# Patient Record
Sex: MALE | Race: WHITE | Employment: FULL TIME | URBAN - METROPOLITAN AREA
[De-identification: names, ages, dates, MRNs, and addresses within clinical notes are randomized per-mention and may not be internally consistent; named-entity substitution may affect disease eponyms.]

---

## 2021-02-10 ENCOUNTER — APPOINTMENT (OUTPATIENT)
Dept: GENERAL RADIOLOGY | Age: 51
End: 2021-02-10
Attending: EMERGENCY MEDICINE
Payer: COMMERCIAL

## 2021-02-10 ENCOUNTER — HOSPITAL ENCOUNTER (OUTPATIENT)
Age: 51
Setting detail: OBSERVATION
Discharge: HOME | End: 2021-02-11
Attending: EMERGENCY MEDICINE | Admitting: FAMILY MEDICINE
Payer: COMMERCIAL

## 2021-02-10 DIAGNOSIS — N20.0 KIDNEY STONE: ICD-10-CM

## 2021-02-10 DIAGNOSIS — R10.9 LEFT FLANK PAIN: Primary | ICD-10-CM

## 2021-02-10 LAB
ALBUMIN SERPL-MCNC: 3.7 G/DL (ref 3.5–5)
ALBUMIN/GLOB SERPL: 1 {RATIO} (ref 1.1–2.2)
ALP SERPL-CCNC: 73 U/L (ref 45–117)
ALT SERPL-CCNC: 48 U/L (ref 12–78)
AMORPH CRY URNS QL MICRO: ABNORMAL
ANION GAP SERPL CALC-SCNC: 5 MMOL/L (ref 5–15)
APPEARANCE UR: ABNORMAL
AST SERPL-CCNC: 41 U/L (ref 15–37)
BACTERIA URNS QL MICRO: NEGATIVE /HPF
BASOPHILS # BLD: 0 K/UL (ref 0–0.1)
BASOPHILS NFR BLD: 0 % (ref 0–1)
BILIRUB SERPL-MCNC: 0.7 MG/DL (ref 0.2–1)
BILIRUB UR QL: NEGATIVE
BUN SERPL-MCNC: 24 MG/DL (ref 6–20)
BUN/CREAT SERPL: 21 (ref 12–20)
CALCIUM SERPL-MCNC: 8.9 MG/DL (ref 8.5–10.1)
CHLORIDE SERPL-SCNC: 103 MMOL/L (ref 97–108)
CO2 SERPL-SCNC: 27 MMOL/L (ref 21–32)
COLOR UR: ABNORMAL
COMMENT, HOLDF: NORMAL
CREAT SERPL-MCNC: 1.14 MG/DL (ref 0.7–1.3)
DIFFERENTIAL METHOD BLD: ABNORMAL
EOSINOPHIL # BLD: 0 K/UL (ref 0–0.4)
EOSINOPHIL NFR BLD: 0 % (ref 0–7)
EPITH CASTS URNS QL MICRO: ABNORMAL /LPF
ERYTHROCYTE [DISTWIDTH] IN BLOOD BY AUTOMATED COUNT: 12.8 % (ref 11.5–14.5)
GLOBULIN SER CALC-MCNC: 3.6 G/DL (ref 2–4)
GLUCOSE SERPL-MCNC: 110 MG/DL (ref 65–100)
GLUCOSE UR STRIP.AUTO-MCNC: NEGATIVE MG/DL
HCT VFR BLD AUTO: 41.8 % (ref 36.6–50.3)
HGB BLD-MCNC: 14 G/DL (ref 12.1–17)
HGB UR QL STRIP: ABNORMAL
IMM GRANULOCYTES # BLD AUTO: 0 K/UL (ref 0–0.04)
IMM GRANULOCYTES NFR BLD AUTO: 0 % (ref 0–0.5)
KETONES UR QL STRIP.AUTO: ABNORMAL MG/DL
LEUKOCYTE ESTERASE UR QL STRIP.AUTO: ABNORMAL
LIPASE SERPL-CCNC: 218 U/L (ref 73–393)
LYMPHOCYTES # BLD: 1.2 K/UL (ref 0.8–3.5)
LYMPHOCYTES NFR BLD: 10 % (ref 12–49)
MCH RBC QN AUTO: 30.4 PG (ref 26–34)
MCHC RBC AUTO-ENTMCNC: 33.5 G/DL (ref 30–36.5)
MCV RBC AUTO: 90.9 FL (ref 80–99)
MONOCYTES # BLD: 0.6 K/UL (ref 0–1)
MONOCYTES NFR BLD: 5 % (ref 5–13)
NEUTS SEG # BLD: 10.1 K/UL (ref 1.8–8)
NEUTS SEG NFR BLD: 85 % (ref 32–75)
NITRITE UR QL STRIP.AUTO: NEGATIVE
NRBC # BLD: 0 K/UL (ref 0–0.01)
NRBC BLD-RTO: 0 PER 100 WBC
PH UR STRIP: 5 [PH] (ref 5–8)
PLATELET # BLD AUTO: 179 K/UL (ref 150–400)
PMV BLD AUTO: 9.6 FL (ref 8.9–12.9)
POTASSIUM SERPL-SCNC: 4 MMOL/L (ref 3.5–5.1)
PROT SERPL-MCNC: 7.3 G/DL (ref 6.4–8.2)
PROT UR STRIP-MCNC: ABNORMAL MG/DL
RBC # BLD AUTO: 4.6 M/UL (ref 4.1–5.7)
RBC #/AREA URNS HPF: ABNORMAL /HPF (ref 0–5)
SAMPLES BEING HELD,HOLD: NORMAL
SODIUM SERPL-SCNC: 135 MMOL/L (ref 136–145)
SP GR UR REFRACTOMETRY: 1.03
UROBILINOGEN UR QL STRIP.AUTO: 1 EU/DL (ref 0.2–1)
WBC # BLD AUTO: 12 K/UL (ref 4.1–11.1)
WBC URNS QL MICRO: ABNORMAL /HPF (ref 0–4)

## 2021-02-10 PROCEDURE — 74018 RADEX ABDOMEN 1 VIEW: CPT

## 2021-02-10 PROCEDURE — 74011000258 HC RX REV CODE- 258: Performed by: FAMILY MEDICINE

## 2021-02-10 PROCEDURE — 81001 URINALYSIS AUTO W/SCOPE: CPT

## 2021-02-10 PROCEDURE — 83690 ASSAY OF LIPASE: CPT

## 2021-02-10 PROCEDURE — 74011250636 HC RX REV CODE- 250/636: Performed by: EMERGENCY MEDICINE

## 2021-02-10 PROCEDURE — 87086 URINE CULTURE/COLONY COUNT: CPT

## 2021-02-10 PROCEDURE — 99218 HC RM OBSERVATION: CPT

## 2021-02-10 PROCEDURE — 74011250636 HC RX REV CODE- 250/636: Performed by: FAMILY MEDICINE

## 2021-02-10 PROCEDURE — 99284 EMERGENCY DEPT VISIT MOD MDM: CPT

## 2021-02-10 PROCEDURE — 80053 COMPREHEN METABOLIC PANEL: CPT

## 2021-02-10 PROCEDURE — 96374 THER/PROPH/DIAG INJ IV PUSH: CPT

## 2021-02-10 PROCEDURE — 96375 TX/PRO/DX INJ NEW DRUG ADDON: CPT

## 2021-02-10 PROCEDURE — 74011000250 HC RX REV CODE- 250: Performed by: FAMILY MEDICINE

## 2021-02-10 PROCEDURE — 96376 TX/PRO/DX INJ SAME DRUG ADON: CPT

## 2021-02-10 PROCEDURE — 36415 COLL VENOUS BLD VENIPUNCTURE: CPT

## 2021-02-10 PROCEDURE — 85025 COMPLETE CBC W/AUTO DIFF WBC: CPT

## 2021-02-10 PROCEDURE — 74011250636 HC RX REV CODE- 250/636

## 2021-02-10 RX ORDER — ACETAMINOPHEN 325 MG/1
650 TABLET ORAL
Status: DISCONTINUED | OUTPATIENT
Start: 2021-02-10 | End: 2021-02-11 | Stop reason: HOSPADM

## 2021-02-10 RX ORDER — SODIUM CHLORIDE 9 MG/ML
150 INJECTION, SOLUTION INTRAVENOUS ONCE
Status: COMPLETED | OUTPATIENT
Start: 2021-02-10 | End: 2021-02-10

## 2021-02-10 RX ORDER — TAMSULOSIN HYDROCHLORIDE 0.4 MG/1
0.4 CAPSULE ORAL DAILY
COMMUNITY

## 2021-02-10 RX ORDER — MORPHINE SULFATE 2 MG/ML
2 INJECTION, SOLUTION INTRAMUSCULAR; INTRAVENOUS
Status: DISCONTINUED | OUTPATIENT
Start: 2021-02-10 | End: 2021-02-11

## 2021-02-10 RX ORDER — ONDANSETRON 2 MG/ML
4 INJECTION INTRAMUSCULAR; INTRAVENOUS
Status: COMPLETED | OUTPATIENT
Start: 2021-02-10 | End: 2021-02-10

## 2021-02-10 RX ORDER — HYDROMORPHONE HYDROCHLORIDE 1 MG/ML
1 INJECTION, SOLUTION INTRAMUSCULAR; INTRAVENOUS; SUBCUTANEOUS
Status: COMPLETED | OUTPATIENT
Start: 2021-02-10 | End: 2021-02-10

## 2021-02-10 RX ORDER — SODIUM CHLORIDE 9 MG/ML
125 INJECTION, SOLUTION INTRAVENOUS CONTINUOUS
Status: DISCONTINUED | OUTPATIENT
Start: 2021-02-10 | End: 2021-02-11 | Stop reason: HOSPADM

## 2021-02-10 RX ORDER — SODIUM CHLORIDE 0.9 % (FLUSH) 0.9 %
5-40 SYRINGE (ML) INJECTION AS NEEDED
Status: DISCONTINUED | OUTPATIENT
Start: 2021-02-10 | End: 2021-02-11 | Stop reason: HOSPADM

## 2021-02-10 RX ORDER — HYDROMORPHONE HYDROCHLORIDE 1 MG/ML
INJECTION, SOLUTION INTRAMUSCULAR; INTRAVENOUS; SUBCUTANEOUS
Status: COMPLETED
Start: 2021-02-10 | End: 2021-02-10

## 2021-02-10 RX ORDER — KETOROLAC TROMETHAMINE 30 MG/ML
30 INJECTION, SOLUTION INTRAMUSCULAR; INTRAVENOUS
Status: COMPLETED | OUTPATIENT
Start: 2021-02-10 | End: 2021-02-10

## 2021-02-10 RX ORDER — SODIUM CHLORIDE 0.9 % (FLUSH) 0.9 %
5-40 SYRINGE (ML) INJECTION EVERY 8 HOURS
Status: DISCONTINUED | OUTPATIENT
Start: 2021-02-10 | End: 2021-02-11 | Stop reason: HOSPADM

## 2021-02-10 RX ORDER — KETOROLAC TROMETHAMINE 10 MG/1
10 TABLET, FILM COATED ORAL
COMMUNITY
End: 2021-02-11

## 2021-02-10 RX ORDER — METOCLOPRAMIDE HYDROCHLORIDE 5 MG/ML
10 INJECTION INTRAMUSCULAR; INTRAVENOUS
Status: COMPLETED | OUTPATIENT
Start: 2021-02-10 | End: 2021-02-10

## 2021-02-10 RX ORDER — TAMSULOSIN HYDROCHLORIDE 0.4 MG/1
0.4 CAPSULE ORAL DAILY
Status: DISCONTINUED | OUTPATIENT
Start: 2021-02-10 | End: 2021-02-11 | Stop reason: HOSPADM

## 2021-02-10 RX ORDER — TAMSULOSIN HYDROCHLORIDE 0.4 MG/1
0.4 CAPSULE ORAL DAILY
Status: DISCONTINUED | OUTPATIENT
Start: 2021-02-11 | End: 2021-02-10 | Stop reason: SDUPTHER

## 2021-02-10 RX ORDER — HYDROCODONE BITARTRATE AND ACETAMINOPHEN 5; 325 MG/1; MG/1
1 TABLET ORAL
COMMUNITY
End: 2021-02-11

## 2021-02-10 RX ORDER — DIPHENHYDRAMINE HYDROCHLORIDE 50 MG/ML
12.5 INJECTION, SOLUTION INTRAMUSCULAR; INTRAVENOUS
Status: DISCONTINUED | OUTPATIENT
Start: 2021-02-10 | End: 2021-02-10

## 2021-02-10 RX ORDER — ONDANSETRON 2 MG/ML
INJECTION INTRAMUSCULAR; INTRAVENOUS
Status: COMPLETED
Start: 2021-02-10 | End: 2021-02-10

## 2021-02-10 RX ORDER — ONDANSETRON 2 MG/ML
INJECTION INTRAMUSCULAR; INTRAVENOUS
Status: DISPENSED
Start: 2021-02-10 | End: 2021-02-11

## 2021-02-10 RX ORDER — MORPHINE SULFATE 2 MG/ML
2 INJECTION, SOLUTION INTRAMUSCULAR; INTRAVENOUS
Status: COMPLETED | OUTPATIENT
Start: 2021-02-10 | End: 2021-02-10

## 2021-02-10 RX ORDER — ONDANSETRON 2 MG/ML
4 INJECTION INTRAMUSCULAR; INTRAVENOUS
Status: DISCONTINUED | OUTPATIENT
Start: 2021-02-10 | End: 2021-02-10 | Stop reason: SDUPTHER

## 2021-02-10 RX ORDER — ONDANSETRON 2 MG/ML
4 INJECTION INTRAMUSCULAR; INTRAVENOUS
Status: DISCONTINUED | OUTPATIENT
Start: 2021-02-10 | End: 2021-02-11 | Stop reason: HOSPADM

## 2021-02-10 RX ADMIN — MORPHINE SULFATE 2 MG: 2 INJECTION, SOLUTION INTRAMUSCULAR; INTRAVENOUS at 20:28

## 2021-02-10 RX ADMIN — SODIUM CHLORIDE 125 ML/HR: 9 INJECTION, SOLUTION INTRAVENOUS at 20:47

## 2021-02-10 RX ADMIN — SODIUM CHLORIDE 1000 ML: 9 INJECTION, SOLUTION INTRAVENOUS at 12:22

## 2021-02-10 RX ADMIN — ONDANSETRON 4 MG: 2 INJECTION INTRAMUSCULAR; INTRAVENOUS at 14:28

## 2021-02-10 RX ADMIN — CEFTRIAXONE SODIUM 1 G: 1 INJECTION, POWDER, FOR SOLUTION INTRAMUSCULAR; INTRAVENOUS at 19:18

## 2021-02-10 RX ADMIN — ONDANSETRON 4 MG: 2 INJECTION INTRAMUSCULAR; INTRAVENOUS at 12:22

## 2021-02-10 RX ADMIN — ONDANSETRON 4 MG: 2 INJECTION INTRAMUSCULAR; INTRAVENOUS at 16:15

## 2021-02-10 RX ADMIN — MORPHINE SULFATE 2 MG: 2 INJECTION, SOLUTION INTRAMUSCULAR; INTRAVENOUS at 12:22

## 2021-02-10 RX ADMIN — HYDROMORPHONE HYDROCHLORIDE 1 MG: 1 INJECTION, SOLUTION INTRAMUSCULAR; INTRAVENOUS; SUBCUTANEOUS at 13:13

## 2021-02-10 RX ADMIN — ONDANSETRON 4 MG: 2 INJECTION INTRAMUSCULAR; INTRAVENOUS at 18:25

## 2021-02-10 RX ADMIN — PROCHLORPERAZINE EDISYLATE 5 MG: 5 INJECTION INTRAMUSCULAR; INTRAVENOUS at 20:20

## 2021-02-10 RX ADMIN — HYDROMORPHONE HYDROCHLORIDE 1 MG: 1 INJECTION, SOLUTION INTRAMUSCULAR; INTRAVENOUS; SUBCUTANEOUS at 16:15

## 2021-02-10 RX ADMIN — SODIUM CHLORIDE 150 ML/HR: 9 INJECTION, SOLUTION INTRAVENOUS at 16:15

## 2021-02-10 RX ADMIN — METOCLOPRAMIDE 10 MG: 5 INJECTION, SOLUTION INTRAMUSCULAR; INTRAVENOUS at 17:33

## 2021-02-10 RX ADMIN — HYDROMORPHONE HYDROCHLORIDE 1 MG: 1 INJECTION, SOLUTION INTRAMUSCULAR; INTRAVENOUS; SUBCUTANEOUS at 14:55

## 2021-02-10 RX ADMIN — KETOROLAC TROMETHAMINE 30 MG: 30 INJECTION, SOLUTION INTRAMUSCULAR at 12:22

## 2021-02-10 NOTE — ED TRIAGE NOTES
Patient arrives c/o left flank pain. Patient states he believes he has a kidney stone. Patient states he saw a urologist, Dr. Rukhsana Musa and was diagnosed with a 6mm stone. Patient states he had abdominal surgery 30 years ago for reflux and since then he has not been able vomit since. Patient was instructed if he got nauseous to come to the ER. Patient denies nausea or pain now.

## 2021-02-10 NOTE — PROGRESS NOTES
Admission Medication Reconciliation: In progress:    Unable to speak with patient face to face at this time due to general isolation precautions in the ED related to COVID-19 pandemic. Chart notes and RX Query were used to update medication list.    Medication changes (since last review): Added all agents (expulsive therapy for kidney stone)    Thank you for allowing me to participate in the care of your patient. Harriett Sanabria PharmD, RN # [phone]     [facility] pharmacy benefit data reflects medications filled and processed through the patient's insurance, however   this data does NOT capture whether the medication was picked up or is currently being taken by the patient. Allergies:  Patient has no known allergies. Significant PMH/Disease States: History reviewed. No pertinent past medical history. Chief Complaint for this Admission:    Chief Complaint   Patient presents with    Back Pain     Prior to Admission Medications:   Prior to Admission Medications   Prescriptions Last Dose Informant Taking? HYDROcodone-acetaminophen (NORCO) 5-325 mg per tablet   Yes   Sig: Take 1 Tab by mouth every six (6) hours as needed for Pain (Kidney stone). X 5 days supply   ketorolac (TORADOL) 10 mg tablet   Yes   Sig: Take 10 mg by mouth every six (6) hours as needed for Pain (Kidney stone). tamsulosin (FLOMAX) 0.4 mg capsule   Yes   Sig: Take 0.4 mg by mouth daily. X 20 days kidney stone      Facility-Administered Medications: None     Please contact the main inpatient pharmacy with any questions or concerns at [phone] and we will direct you to the clinical pharmacist covering this patient's care while in-house.    DANYELLE Squires

## 2021-02-10 NOTE — ED PROVIDER NOTES
HPI patient is a 49-year-old male with past medical history significant for hernia repair, meniscus repair and kidney stones who presents to the ED with left flank pain. He was evaluated by Dr. Anderson Aldridge on Monday and told he had a 6 mm left sided kidney stone. He was prescribed Flomax and oxycodone. The pain returned abruptly this morning and he called their office. He was encouraged to take his pain medicine and if no relief to proceed to the ED. Denies fever, cold symptoms, headache,neck pain, visual changes, focal weakness or rash. Denies any  difficulty breathing, difficulty swallowing, SOB or chest pain. Reports nausea but denies any vomiting or diarrhea. History reviewed. No pertinent past medical history. Past Surgical History:   Procedure Laterality Date    HX HERNIA REPAIR      HX MENISCUS REPAIR Right          History reviewed. No pertinent family history.     Social History     Socioeconomic History    Marital status:      Spouse name: Not on file    Number of children: Not on file    Years of education: Not on file    Highest education level: Not on file   Occupational History    Not on file   Social Needs    Financial resource strain: Not on file    Food insecurity     Worry: Not on file     Inability: Not on file    Transportation needs     Medical: Not on file     Non-medical: Not on file   Tobacco Use    Smoking status: Never Smoker    Smokeless tobacco: Never Used   Substance and Sexual Activity    Alcohol use: Never     Frequency: Never    Drug use: Never    Sexual activity: Not on file   Lifestyle    Physical activity     Days per week: Not on file     Minutes per session: Not on file    Stress: Not on file   Relationships    Social connections     Talks on phone: Not on file     Gets together: Not on file     Attends Confucianism service: Not on file     Active member of club or organization: Not on file     Attends meetings of clubs or organizations: Not on file     Relationship status: Not on file    Intimate partner violence     Fear of current or ex partner: Not on file     Emotionally abused: Not on file     Physically abused: Not on file     Forced sexual activity: Not on file   Other Topics Concern    Not on file   Social History Narrative    Not on file         ALLERGIES: Patient has no known allergies. Review of Systems   Constitutional: Negative for activity change, appetite change, fever and unexpected weight change. HENT: Negative for congestion, rhinorrhea, sore throat and trouble swallowing. Eyes: Negative for visual disturbance. Respiratory: Negative for cough and shortness of breath. Cardiovascular: Negative for chest pain, palpitations and leg swelling. Gastrointestinal: Positive for nausea. Negative for abdominal pain, constipation and diarrhea. Genitourinary: Positive for difficulty urinating, flank pain and hematuria. Musculoskeletal: Positive for back pain. Negative for arthralgias, joint swelling and myalgias. Skin: Negative for rash. Neurological: Negative for dizziness, light-headedness and headaches. All other systems reviewed and are negative. Vitals:    02/10/21 1136   BP: 125/78   Pulse: 64   Resp: 16   Temp: 97.5 °F (36.4 °C)   SpO2: 96%   Weight: 77.1 kg (170 lb)   Height: 5' 10\" (1.778 m)            Physical Exam  Vitals signs and nursing note reviewed. Constitutional:       General: He is not in acute distress. Appearance: Normal appearance. He is normal weight. He is not ill-appearing, toxic-appearing or diaphoretic. Comments: Male; ; non smoker   HENT:      Head: Normocephalic. Neck:      Musculoskeletal: Normal range of motion and neck supple. Cardiovascular:      Rate and Rhythm: Normal rate and regular rhythm. Pulmonary:      Effort: Pulmonary effort is normal.      Breath sounds: Normal breath sounds.    Abdominal:      General: Bowel sounds are normal.      Palpations: Abdomen is soft. Tenderness: There is no abdominal tenderness. There is no guarding or rebound. Hernia: No hernia is present. Musculoskeletal:         General: Tenderness present. No swelling, deformity or signs of injury. Right lower leg: No edema. Left lower leg: No edema. Comments: Reports left flank pain; Skin integrity is intact. There is no obvious deformity, rash, bruising or erythema. Good neurovascular sensation. No apparent tendon or nerve injury. Lymphadenopathy:      Cervical: No cervical adenopathy. Skin:     General: Skin is warm and dry. Findings: No rash. Neurological:      General: No focal deficit present. Mental Status: He is alert and oriented to person, place, and time. Psychiatric:         Mood and Affect: Mood normal.         Behavior: Behavior normal.          MDM       Procedures    Pt was medicated with Toradol, zofran and morphine IV without relief. Dilaudid 1 mg IV was given with some relief noted. [place] [facility] (Dr. John Mahmood) was consulted; Elizabeth Petty NP came to the ED to evaluate. She offered admission for pain control and further evaluation. Patient has been reexamined and wants to be admitted. Labs Reviewed   CBC WITH AUTOMATED DIFF - Abnormal; Notable for the following components:       Result Value    WBC 12.0 (*)     NEUTROPHILS 85 (*)     LYMPHOCYTES 10 (*)     ABS.  NEUTROPHILS 10.1 (*)     All other components within normal limits   METABOLIC PANEL, COMPREHENSIVE - Abnormal; Notable for the following components:    Sodium 135 (*)     Glucose 110 (*)     BUN 24 (*)     BUN/Creatinine ratio 21 (*)     AST (SGOT) 41 (*)     A-G Ratio 1.0 (*)     All other components within normal limits   URINALYSIS W/ RFLX MICROSCOPIC - Abnormal; Notable for the following components:    Appearance CLOUDY (*)     Protein TRACE (*)     Ketone TRACE (*)     Blood MODERATE (*)     Leukocyte Esterase TRACE (*)     Amorphous Crystals FEW (*)     All other components within normal limits   LIPASE   *UA&MICRO CHARGE BAT     Xr Abd (kub)    Result Date: 2/10/2021  Gas pattern is within normal limits. Dr. Mary Oliva ([place] [facility])was consulted; wants the hospitalist to admit for intractable kidney stone pain. Perfect Serve Consult for Admission  4:12 PM    ED Room Number: R37/R37  Patient Name and age:  Izabella Mata 44 y.o.  male  Working Diagnosis:   1. Left flank pain    2. Kidney stone        COVID-19 Suspicion:  no  Sepsis present:  no  Reassessment needed: no  Code Status:  Full Code  Readmission: no  Isolation Requirements:  no  Recommended Level of Care:  med/surg  Department:[facility] Adult ED - 21   Other:       4:37 PM  Patient's results and plan of care have been reviewed with him. Patient has verbally conveyed his understanding and agreement of his signs, symptoms, diagnosis, treatment and prognosis and additionally agrees to be admitted. Mihir Reyes NP  Discussed plan of care with Dr. Gordy Gonzales.  Mihir Reyes NP

## 2021-02-10 NOTE — H&P
History & Physical    Primary Care Provider: Olga Rodriguez MD  Source of Information: Patient     History of Presenting Illness:   Izzy Miles is a 44 y.o. male who presents with flank pain    History is primary obtained from the patient    Patient reports that he started having some left flank pain about a week back. Patient reports he was evaluated by urologist on Monday, and was told that he had a 6 mm left kidney stone. Patient was prescribed Flomax and oxycodone. Patient reports that he continued to have some discomfort but the pain was better controlled. Patient reports that the left flank pain became intense earlier today, he called the urologist office and was told to come to the ER for further management and evaluation. Patient reports that he started having some nausea associated with his pain. Patient denies any other complaints or problems. Patient reports that he cannot vomit because he underwent Nissen fundoplication. The patient denies any Headache, blurry vision, sore throat, trouble swallowing, trouble with speech, chest pain, SOB, cough, fever, chills, N/V/D,  constipation, recent travels, sick contacts, focal or generalized neurological symptoms,  falls, injuries, rashes, contact with COVID-19 diagnosed patients, hematemesis, melena, hemoptysis, hematuria, rashes, denies starting any new medications and denies any other concerns or problems besides as mentioned above. Review of Systems:  A comprehensive review of systems was negative except for that written in the History of Present Illness. History reviewed. No pertinent past medical history. Past Surgical History:   Procedure Laterality Date    HX HERNIA REPAIR      HX MENISCUS REPAIR Right      Prior to Admission medications    Medication Sig Start Date End Date Taking?  Authorizing Provider   HYDROcodone-acetaminophen (NORCO) 5-325 mg per tablet Take 1 Tab by mouth every six (6) hours as needed for Pain (Kidney stone). X 5 days supply   Yes Provider, Historical   ketorolac (TORADOL) 10 mg tablet Take 10 mg by mouth every six (6) hours as needed for Pain (Kidney stone). Yes Provider, Historical   tamsulosin (FLOMAX) 0.4 mg capsule Take 0.4 mg by mouth daily. X 20 days kidney stone   Yes Provider, Historical     No Known Allergies   History reviewed. No pertinent family history. SOCIAL HISTORY:  Patient resides:  Independently X   Assisted Living    SNF    With family care       Smoking history:   None X   Former    Chronic      Alcohol history:   None    Social X   Chronic      Ambulates:   Independently X   w/cane    w/walker    w/wc    CODE STATUS:  DNR    Full X   Other      Objective:     Physical Exam:     Visit Vitals  /78 (BP 1 Location: Right upper arm, BP Patient Position: Sitting)   Pulse 64   Temp 97.5 °F (36.4 °C)   Resp 16   Ht 5' 10\" (1.778 m)   Wt 77.1 kg (170 lb)   SpO2 96%   BMI 24.39 kg/m²      O2 Device: Room air    General : alert x 3, awake, no acute distress, resting in bed, pleasant male appears to be stated age  HEENT: PEERL, EOMI, moist mucus membrane, TM clear  Neck: supple, no JVD, no meningeal signs  Chest: Clear to auscultation bilaterally   CVS: S1 S2 heard, Capillary refill less than 2 seconds  Abd: soft/left flank tenderness/no rebound/no guarding  Ext: no clubbing, no cyanosis, no edema, brisk 2+ DP pulses  Neuro/Psych: pleasant mood and affect, CN 2-12 grossly intact,  Skin: warm        Data Review:     Recent Days:  Recent Labs     02/10/21  1157   WBC 12.0*   HGB 14.0   HCT 41.8        Recent Labs     02/10/21  1157   *   K 4.0      CO2 27   *   BUN 24*   CREA 1.14   CA 8.9   ALB 3.7   ALT 48     No results for input(s): PH, PCO2, PO2, HCO3, FIO2 in the last 72 hours.     24 Hour Results:  Recent Results (from the past 24 hour(s))   CBC WITH AUTOMATED DIFF    Collection Time: 02/10/21 11:57 AM   Result Value Ref Range WBC 12.0 (H) 4.1 - 11.1 K/uL    RBC 4.60 4.10 - 5.70 M/uL    HGB 14.0 12.1 - 17.0 g/dL    HCT 41.8 36.6 - 50.3 %    MCV 90.9 80.0 - 99.0 FL    MCH 30.4 26.0 - 34.0 PG    MCHC 33.5 30.0 - 36.5 g/dL    RDW 12.8 11.5 - 14.5 %    PLATELET 333 967 - 410 K/uL    MPV 9.6 8.9 - 12.9 FL    NRBC 0.0 0  WBC    ABSOLUTE NRBC 0.00 0.00 - 0.01 K/uL    NEUTROPHILS 85 (H) 32 - 75 %    LYMPHOCYTES 10 (L) 12 - 49 %    MONOCYTES 5 5 - 13 %    EOSINOPHILS 0 0 - 7 %    BASOPHILS 0 0 - 1 %    IMMATURE GRANULOCYTES 0 0.0 - 0.5 %    ABS. NEUTROPHILS 10.1 (H) 1.8 - 8.0 K/UL    ABS. LYMPHOCYTES 1.2 0.8 - 3.5 K/UL    ABS. MONOCYTES 0.6 0.0 - 1.0 K/UL    ABS. EOSINOPHILS 0.0 0.0 - 0.4 K/UL    ABS. BASOPHILS 0.0 0.0 - 0.1 K/UL    ABS. IMM. GRANS. 0.0 0.00 - 0.04 K/UL    DF AUTOMATED     METABOLIC PANEL, COMPREHENSIVE    Collection Time: 02/10/21 11:57 AM   Result Value Ref Range    Sodium 135 (L) 136 - 145 mmol/L    Potassium 4.0 3.5 - 5.1 mmol/L    Chloride 103 97 - 108 mmol/L    CO2 27 21 - 32 mmol/L    Anion gap 5 5 - 15 mmol/L    Glucose 110 (H) 65 - 100 mg/dL    BUN 24 (H) 6 - 20 MG/DL    Creatinine 1.14 0.70 - 1.30 MG/DL    BUN/Creatinine ratio 21 (H) 12 - 20      GFR est AA >60 >60 ml/min/1.73m2    GFR est non-AA >60 >60 ml/min/1.73m2    Calcium 8.9 8.5 - 10.1 MG/DL    Bilirubin, total 0.7 0.2 - 1.0 MG/DL    ALT (SGPT) 48 12 - 78 U/L    AST (SGOT) 41 (H) 15 - 37 U/L    Alk.  phosphatase 73 45 - 117 U/L    Protein, total 7.3 6.4 - 8.2 g/dL    Albumin 3.7 3.5 - 5.0 g/dL    Globulin 3.6 2.0 - 4.0 g/dL    A-G Ratio 1.0 (L) 1.1 - 2.2     LIPASE    Collection Time: 02/10/21 11:57 AM   Result Value Ref Range    Lipase 218 73 - 393 U/L   URINALYSIS W/ RFLX MICROSCOPIC    Collection Time: 02/10/21 11:57 AM   Result Value Ref Range    Color DARK YELLOW      Appearance CLOUDY (A) CLEAR      Specific gravity 1.030      pH (UA) 5.0 5.0 - 8.0      Protein TRACE (A) NEG mg/dL    Glucose Negative NEG mg/dL    Ketone TRACE (A) NEG mg/dL Bilirubin Negative NEG      Blood MODERATE (A) NEG      Urobilinogen 1.0 0.2 - 1.0 EU/dL    Nitrites Negative NEG      Leukocyte Esterase TRACE (A) NEG      WBC 0-4 0 - 4 /hpf    RBC  0 - 5 /hpf    Epithelial cells FEW FEW /lpf    Bacteria Negative NEG /hpf    Amorphous Crystals FEW (A) NEG           Imaging:   Xr Abd (kub)    Result Date: 2/10/2021  Gas pattern is within normal limits.        Assessment:     Left renal stone: Patient will be admitted on a surgical bed, IV hydration, antiemetics, empirical IV antibiotics, appreciate urology input, continue Flomax, possible cystoscopy in the morning, n.p.o. after midnight, pain control, supportive care and close monitoring, further intervention per hospital course    Leukocytosis: Likely secondary to above, empirical IV antibiotics, urine culture pending, continue to monitor    GI DVT prophylaxis: Patient will be on SCDs             Signed By: Leidy Oakley MD     February 10, 2021

## 2021-02-10 NOTE — CONSULTS
Requesting Provider: Myrtie Sever, MD - Reason for Consultation: Marsa Forget"  Pre-existing [facility] Patient:   Yes                Patient: Anselmo Pickett MRN: [id number]  SSN: [id number]    YOB: 1970  Age: 44 y.o. Sex: male     Location: R37/R37       Code Status: No Order   PCP: Efrain Wolf MD  - [phone]   Emergency Contact:  Primary Emergency Contact: Natalia Francisco, Home Phone: [phone]   Race/Sikh/Language: [address] / Melanie Kanaris / Siva [place]: Payor: [place] [employer] / Plan: Hilda Cooler / Product Type: HMO /    Prior Admission Data:         Hospitalized:  Hospital Day: 1 - Admitted 2/10/2021 11:41 AM     CONSULTANTS  IP CONSULT TO UROLOGY   ADMISSION DIAGNOSES  No diagnosis found. Assessment/Plan:       · 6 mm left UVJ stone with mild hydro  · Left flank pain    - Recommend admission to the hospitalist service for pain management.   - NPO at midnight for cystoscopy, left retrograde pyelogram, ureteroscopy with laser lithotripsy, and left ureteral stent placement tomorrow. Please obtain consent. He is tentatively scheduled with the OR at 3:30 PM with Dr. Marcela Paget. Will re-evaluate his pain in the AM for possible discharge home with outpatient treatment if able. - Please notify us sooner if patient clinically declines and develops signs/symptoms of sepsis. - Pain management. - Continue flomax and strain all urine.   - Will continue to follow. Case discussed with Dr. Janet Curry. CC: Back Pain   HPI: He is a 44 y.o. male with PMH of hernia repair and meniscus repair. He presented to the ED on 1/10/21 with left flank pain. Urology consulted for left ureteral stone. He was seen at [facility] on Monday 2/8/21 by Dr. Janet Curry for gross hematuria and left flank pain. He had a KUB and CT stone at , where he was diagnosed with a 6 mm Left UVJ stone.  He agreed to try medical expulsive therapy and was sent home on flomax with oxycodone and a urine strainer. His pain has been uncontrolled with the oral medication which prompted him to present to the ED today. The pain is in his left flank with associated nausea. He has required IV dilaudid and morphine for pain control. He denies voiding difficulty or passing stone fragments. He reports frequency and hematuria. Denies clots or dysuria. His abdomen is soft, mild RLQ and LLQ tenderness to palpation. No CVA tenderness. No UA to assess. He is AF, VSS. He is not septic. WBC mildly elevated at 12. Hgb stable. Kidney function wnl. UA + for red cells consistent with stone, no evidence of infection. KUB done in the ED today, image personally reviewed. Linear shadowing noted over lower sacrum consistent with stone. Discussed treatment options including admission for pain control or home with oral narcotics. He feels as though he cannot tolerate going home overnight due to pain. He wishes to stay for pain management. Will attempt to arrange ureteroscopy tomorrow if there is OR availability; otherwise can re-evaluate pain in the AM and consider stone treatment outpatient at our surgery center. CT stone at  on 2/8/21:  Urinary tract:  1.  6 mm distal left UVJ stone correlating to the comparison KUB study. There is mild left hydronephrosis. 2. No right urinary tract stones or right hydronephrosis. 3. Evaluation of the renal parenchyma is limited by lack of IV contrast.    Abdomen and pelvis:  1. No additional clinically significant findings.    =====Last OV Note=====  Gilmarstnatasha Goldstein is a 44year old male who presents today for \"left flank pain\". A consultation was requested by Olga Royal MD. The patient is referred for brief episodes of gross hematuria several weeks ago. More recently left flank and left lower quadrant discomfort. Asymptomatic this morning. No known prior stone history. Urine today negative. KUB reviewed. Density of about 3 x 5 mm suspicious for left UVJ stone.       PAST MEDICAL HISTORY:    Allergies: * NONE (Critical)  DENIES: Latex, Shellfish, X-Ray Dye, Iodine. Medications: FLOMAX 0.4 MG ORAL CAPSULE (TAMSULOSIN HCL) 1 capsule daily; Route: ORAL  KETOROLAC TROMETHAMINE 10 MG ORAL TABLET (KETOROLAC TROMETHAMINE) 1 po q 6 hrs prn; Route: ORAL  TYLENOL 325 MG ORAL CAPSULE (ACETAMINOPHEN) PRN pain; Route: ORAL    Problems: Gross hematuria (ICD-599.71) (HGK00-B56.0)  Calculus, ureter (ICD-592.1) (WNW79-Y14.1)  Flank pain (ICD-789.09) (NUZ65-O29.9)    Illnesses: DENIES: Heart Disease, Pacemaker/Defibrillator, Lung Disease, Diabetes, High Blood Pressure, Bowel Problems, Stroke/Seizure, Kidney Problems, Bleeding Problems, HIV, Hepatitis, or Cancer. Surgeries: Hernia Repair. Family History: Prostate Cancer and Kidney Cancer. DENIES: Kidney disease, Kidney stones. Social History: Full Time - [employer]. . Smoking status: Never. Does not drink alcohol. System Review: Admits to: None. DENIES: Unexplained Weight Loss, Dry Eyes, Dry Mouth, Leg Swelling, Shortness of Breath, Constipation, Involuntary Urine Loss, Lower Extremity Weakness, Dry Skin, Difficulty Walking, Psychiatric Problems, Impaired Sex Drive, Easy Bleeding, Rash.      EXAMINATION: Vitals: Pulse: 55 BP: 131/79 Weight: 180 lbs Height: 5' 11\" BMI: 25.20 kg/m^2  Appearance: well-developed NAD Eyes: conjunctivae and lids normal ENMT: External HEENT unremarkable Respiratory: breathing easily Cardiovascular: no edema Abdomen/Flank: soft non-tender; no CVA tenderness Musculoskelatal: no deformity Skin: warm and dry       URINALYSIS from Voided specimen  Urine Dip: pH: 6.0, Bld: Mod NH, LE: Neg, Nit: Neg, Prot: Trace, Ket: Neg, Gluc: Neg  Urine Micro: WBC: 0, RBC: <3, Bacteria: 0    Prescription(s) Today: FLOMAX 0.4 MG ORAL CAPSULE (TAMSULOSIN HCL) 1 capsule daily  #20[Capsule] x 4,  02/08/2021, Amy Jama MD  KETOROLAC TROMETHAMINE 10 MG ORAL TABLET (KETOROLAC TROMETHAMINE) 1 po q 6 hrs prn #20[Tablet] x 0,  2021, Sara Zee MD    IMPRESSION:    1. GROSS HEMATURIA (XPY28-P87.0) - New    2. CALCULUS - URETER (HKU24-Y57.1) - New    3. FLANK PAIN (IJK56-V95.9) - New    PLAN: We have reviewed these findings. Recent gross hematuria and left flank pain suggest possible left ureteral stone. Possible density on KUB near left UVJ. No prior proven stone. We will try to move to noncontrast CT scan as next step given above. We were able to obtain a CT scan. This does confirm a left UVJ stone. Final reading is pending. No other significant stones noted. May have a punctate left renal stone. We have reviewed these findings. Hopeful that he can pass this. Urine strainer provided. Toradol and Flomax provided. Plan repeat KUB and see me in 1 to 2 weeks. We can notify if needed pending final CT reading. The patient was given a verbal/written log of Blood Pressure and recommendations. Borderline Hypertension: Discuss this mildly elevated BP at next routine followup. cc: Deena Song MD  Transcribed by [employer]  Today's Services  E&M Service, Urinalysis Microscopic    Upcoming Orders  Return Office Visit - with Sara Zee MD in 1 week  KUB, UA        ]      Electronically signed by Sara Zee MD on 2021 at 11:58 AM    ________________________________________________________________________        Temp (24hrs), Av.5 °F (36.4 °C), Min:97.5 °F (36.4 °C), Max:97.5 °F (36.4 °C)    Urinary Status: Left flank pain  Creatinine   Date/Time Value Ref Range Status   02/10/2021 11:57 AM 1.14 0.70 - 1.30 MG/DL Final     Current Antimicrobial Therapy (168h ago, onward)    None        Key Anti-Platelet Anticoagulant Meds     The patient is on no antiplatelet meds or anticoagulants.         Diet: No diet orders on file -       Labs     Lab Results   Component Value Date/Time    WBC 12.0 (H) 02/10/2021 11:57 AM    HCT 41.8 02/10/2021 11:57 AM    PLATELET 278 02/10/2021 11:57 AM    Sodium 135 (L) 02/10/2021 11:57 AM    Potassium 4.0 02/10/2021 11:57 AM    Chloride 103 02/10/2021 11:57 AM    CO2 27 02/10/2021 11:57 AM    BUN 24 (H) 02/10/2021 11:57 AM    Creatinine 1.14 02/10/2021 11:57 AM    Glucose 110 (H) 02/10/2021 11:57 AM    Calcium 8.9 02/10/2021 11:57 AM     UA:   Lab Results   Component Value Date/Time    Color DARK YELLOW 02/10/2021 11:57 AM    Appearance CLOUDY (A) 02/10/2021 11:57 AM    Specific gravity 1.030 02/10/2021 11:57 AM    pH (UA) 5.0 02/10/2021 11:57 AM    Protein TRACE (A) 02/10/2021 11:57 AM    Glucose Negative 02/10/2021 11:57 AM    Ketone TRACE (A) 02/10/2021 11:57 AM    Bilirubin Negative 02/10/2021 11:57 AM    Urobilinogen 1.0 02/10/2021 11:57 AM    Nitrites Negative 02/10/2021 11:57 AM    Leukocyte Esterase TRACE (A) 02/10/2021 11:57 AM    Epithelial cells FEW 02/10/2021 11:57 AM    Bacteria Negative 02/10/2021 11:57 AM    WBC 0-4 02/10/2021 11:57 AM    RBC  02/10/2021 11:57 AM     Imaging     Results for orders placed during the hospital encounter of 06/17/15   CT HEART W/O CONT WITH CALCIUM    Narrative **Final Report**       ICD Codes / Adm. Diagnosis: V82.9   / Screening for unspecified cond    Examination:  CT HEART CALCIUM SCORING  - [id number] - Jun 17 2015  8:50AM  Accession No:  [id number]  Reason:  SCREENING      REPORT:  EXAM:  CT HEART CALCIUM SCORING    INDICATION:  SCREENING    COMPARISON: None. TECHNIQUE: Unenhanced multislice helical CT of the heart was performed on a   64-slice multiple detector row CT system (PrognomixT). Quantitative coronary   artery CT calcium scoring was performed using [employer] (STIE) software. No intravenous   contrast was administered.     FINDINGS:  The coronary calcium in each vessel is as follows:    Left main coronary artery: 0  Left anterior descending coronary artery: 0  Left circumflex coronary artery: 0  Right coronary artery: 0  Posterior descending coronary artery: 0    Total calcium score: 0     Calcium score interpretation:  0-0 = No evidence of CAD  1-10 = Minimal evidence of CAD   = Mild evidence of CAD  101-400 = Moderate evidence of CAD  >400 = Extensive evidence of CAD    Your score of 0 places you in the 10 percentile rank. That means that 90% of   men from 55to 48years old will have a higher calcium score than you. Chest CT findings:  The visualized lungs are clear. The entire lung fields   are not imaged on this examination. No mediastinal lymphadenopathy. The   visualized unenhanced upper abdomen is normal.         IMPRESSION: Total calcium score of 0. A low score suggests a low likelihood   of coronary disease but does not exclude the possibility of significant   coronary artery narrowing. The result should be discussed with your   physician taking into account other risk factors such as age, gender, family   history, diabetes, smoking or high cholesterol levels. Signing/Reading Doctor: Jarvis Cali ([id number])    Approved: Jarvis Cali ([id number])  Jun 17 2015  9:20AM                                 US Results (most recent):  No results found for this or any previous visit. Cultures     All Micro Results     None           Past History: (Complete 2+/3 areas)   No Known Allergies   No current facility-administered medications for this encounter. No current outpatient medications on file. Prior to Admission medications    Not on File        PMHx:  has no past medical history on file. PSurgHx:  has a past surgical history that includes hx hernia repair and hx meniscus repair (Right). PSocHx:  reports that he has never smoked. He has never used smokeless tobacco. He reports that he does not drink alcohol or use drugs.    ROS:  (Complete - 10 systems) - DENIES: Weightloss (Constitutional), Dry mouth (ENMT), Chest pain (CV), SOB (Respiratory), Constipation (GI), Weakness (MS), Pallor (Skin), TIA Sx (Neuro), Confusion (Psych), Easy bruising (Heme)    Physical Exam: (Comprehesive - 8+ 1995 Systems)     (1) Constitutional:  FIO2:   on SpO2: O2 Sat (%): 96 %  O2 Device: Room air    Patient Vitals for the past 24 hrs:   BP Temp Pulse Resp SpO2 Height Weight   02/10/21 1136 125/78 97.5 °F (36.4 °C) 64 16 96 % 5' 10\" (1.778 m) 77.1 kg (170 lb)          (2) ENMT:   moist mucous membranes, normal sinuses   (3) Respiratory:  breathing easily, no distress   (4) GI:  no abdominal masses, tenderness   (5) :   normal   (6) Lymphatic:  no adenopathy, neck supple   (7) Muscloskeletal:  no gross deformity, normal ROM   (8) Skin:  no rash, warm & dry   (9) Neuro:  no focal deficits, normal speech      Signed By: Damien Solares NP  - February 10, 2021

## 2021-02-11 VITALS
BODY MASS INDEX: 24.34 KG/M2 | TEMPERATURE: 98.2 F | RESPIRATION RATE: 16 BRPM | HEART RATE: 60 BPM | SYSTOLIC BLOOD PRESSURE: 129 MMHG | HEIGHT: 70 IN | DIASTOLIC BLOOD PRESSURE: 81 MMHG | OXYGEN SATURATION: 96 % | WEIGHT: 170 LBS

## 2021-02-11 LAB
ANION GAP SERPL CALC-SCNC: 9 MMOL/L (ref 5–15)
BACTERIA SPEC CULT: NORMAL
BASOPHILS # BLD: 0 K/UL (ref 0–0.1)
BASOPHILS NFR BLD: 0 % (ref 0–1)
BUN SERPL-MCNC: 24 MG/DL (ref 6–20)
BUN/CREAT SERPL: 15 (ref 12–20)
CALCIUM SERPL-MCNC: 8.5 MG/DL (ref 8.5–10.1)
CHLORIDE SERPL-SCNC: 106 MMOL/L (ref 97–108)
CO2 SERPL-SCNC: 22 MMOL/L (ref 21–32)
CREAT SERPL-MCNC: 1.63 MG/DL (ref 0.7–1.3)
DIFFERENTIAL METHOD BLD: ABNORMAL
EOSINOPHIL # BLD: 0 K/UL (ref 0–0.4)
EOSINOPHIL NFR BLD: 0 % (ref 0–7)
ERYTHROCYTE [DISTWIDTH] IN BLOOD BY AUTOMATED COUNT: 13 % (ref 11.5–14.5)
GLUCOSE SERPL-MCNC: 91 MG/DL (ref 65–100)
HCT VFR BLD AUTO: 39.7 % (ref 36.6–50.3)
HGB BLD-MCNC: 13.3 G/DL (ref 12.1–17)
IMM GRANULOCYTES # BLD AUTO: 0 K/UL (ref 0–0.04)
IMM GRANULOCYTES NFR BLD AUTO: 0 % (ref 0–0.5)
LYMPHOCYTES # BLD: 2.2 K/UL (ref 0.8–3.5)
LYMPHOCYTES NFR BLD: 19 % (ref 12–49)
MCH RBC QN AUTO: 30.6 PG (ref 26–34)
MCHC RBC AUTO-ENTMCNC: 33.5 G/DL (ref 30–36.5)
MCV RBC AUTO: 91.3 FL (ref 80–99)
MONOCYTES # BLD: 1.3 K/UL (ref 0–1)
MONOCYTES NFR BLD: 12 % (ref 5–13)
NEUTS SEG # BLD: 7.8 K/UL (ref 1.8–8)
NEUTS SEG NFR BLD: 69 % (ref 32–75)
NRBC # BLD: 0 K/UL (ref 0–0.01)
NRBC BLD-RTO: 0 PER 100 WBC
PLATELET # BLD AUTO: 168 K/UL (ref 150–400)
PMV BLD AUTO: 9.9 FL (ref 8.9–12.9)
POTASSIUM SERPL-SCNC: 3.8 MMOL/L (ref 3.5–5.1)
RBC # BLD AUTO: 4.35 M/UL (ref 4.1–5.7)
SERVICE CMNT-IMP: NORMAL
SODIUM SERPL-SCNC: 137 MMOL/L (ref 136–145)
WBC # BLD AUTO: 11.4 K/UL (ref 4.1–11.1)

## 2021-02-11 PROCEDURE — 99218 HC RM OBSERVATION: CPT

## 2021-02-11 PROCEDURE — 80048 BASIC METABOLIC PNL TOTAL CA: CPT

## 2021-02-11 PROCEDURE — 85025 COMPLETE CBC W/AUTO DIFF WBC: CPT

## 2021-02-11 PROCEDURE — 36415 COLL VENOUS BLD VENIPUNCTURE: CPT

## 2021-02-11 PROCEDURE — 74011250637 HC RX REV CODE- 250/637: Performed by: NURSE PRACTITIONER

## 2021-02-11 PROCEDURE — 96376 TX/PRO/DX INJ SAME DRUG ADON: CPT

## 2021-02-11 PROCEDURE — 74011250636 HC RX REV CODE- 250/636: Performed by: FAMILY MEDICINE

## 2021-02-11 PROCEDURE — 74011250636 HC RX REV CODE- 250/636: Performed by: NURSE PRACTITIONER

## 2021-02-11 PROCEDURE — 74011250637 HC RX REV CODE- 250/637: Performed by: FAMILY MEDICINE

## 2021-02-11 RX ORDER — HYDROMORPHONE HYDROCHLORIDE 2 MG/1
1 TABLET ORAL
Status: DISCONTINUED | OUTPATIENT
Start: 2021-02-11 | End: 2021-02-11 | Stop reason: HOSPADM

## 2021-02-11 RX ORDER — MORPHINE SULFATE 2 MG/ML
2 INJECTION, SOLUTION INTRAMUSCULAR; INTRAVENOUS ONCE
Status: COMPLETED | OUTPATIENT
Start: 2021-02-11 | End: 2021-02-11

## 2021-02-11 RX ORDER — MORPHINE SULFATE 2 MG/ML
2 INJECTION, SOLUTION INTRAMUSCULAR; INTRAVENOUS
Status: DISCONTINUED | OUTPATIENT
Start: 2021-02-11 | End: 2021-02-11 | Stop reason: HOSPADM

## 2021-02-11 RX ORDER — ONDANSETRON 4 MG/1
4 TABLET, ORALLY DISINTEGRATING ORAL
Status: DISCONTINUED | OUTPATIENT
Start: 2021-02-11 | End: 2021-02-11 | Stop reason: HOSPADM

## 2021-02-11 RX ADMIN — MORPHINE SULFATE 2 MG: 2 INJECTION, SOLUTION INTRAMUSCULAR; INTRAVENOUS at 01:07

## 2021-02-11 RX ADMIN — MORPHINE SULFATE 2 MG: 2 INJECTION, SOLUTION INTRAMUSCULAR; INTRAVENOUS at 04:35

## 2021-02-11 RX ADMIN — ONDANSETRON 4 MG: 2 INJECTION INTRAMUSCULAR; INTRAVENOUS at 01:07

## 2021-02-11 RX ADMIN — SODIUM CHLORIDE 125 ML/HR: 9 INJECTION, SOLUTION INTRAVENOUS at 01:11

## 2021-02-11 RX ADMIN — ONDANSETRON 4 MG: 2 INJECTION INTRAMUSCULAR; INTRAVENOUS at 04:35

## 2021-02-11 RX ADMIN — ONDANSETRON 4 MG: 4 TABLET, ORALLY DISINTEGRATING ORAL at 09:57

## 2021-02-11 RX ADMIN — ACETAMINOPHEN 650 MG: 325 TABLET ORAL at 08:08

## 2021-02-11 RX ADMIN — HYDROMORPHONE HYDROCHLORIDE 1 MG: 2 TABLET ORAL at 09:57

## 2021-02-11 NOTE — PROGRESS NOTES
Problem: Falls - Risk of  Goal: *Absence of Falls  Description: Document Tony Figueredo Fall Risk and appropriate interventions in the flowsheet.   Outcome: Resolved/Met     Problem: Patient Education: Go to Patient Education Activity  Goal: Patient/Family Education  Outcome: Resolved/Met

## 2021-02-11 NOTE — PROGRESS NOTES
Patient: Edgar Sandoval MRN: [id number]  SSN: [id number]    YOB: 1970  Age: 44 y.o. Sex: male        ADMITTED: 2/10/2021 to Roberth Genao MD by Neetu Sosa MD for Renal stone [N20.0]  POD# * No surgery date entered * Procedure(s):    seen at [facility] on 21 by Dr. Fernando Moon for gross hematuria and left flank pain. He had a KUB and CT stone at , where he was diagnosed with a 6 mm Left UVJ stone. He agreed to try medical expulsive therapy and was sent home on flomax with oxycodone and a urine strainer. Pain uncontrolled which prompted admission yesterday. +frequency, hematuria. Denies dysuria. Patient feels comfortable with outpatient L CRULS today with [facility] at 1 pm.    afvss  WBC: 11.4  Hgb: wnl  Cr: 1.63  UA:  RBCs  Ucx: pending  +rocephin  +flomax       CT stone at  on 21:  Urinary tract:  1.  6 mm distal left UVJ stone correlating to the comparison KUB study. There is mild left hydronephrosis. 2. No right urinary tract stones or right hydronephrosis. 3. Evaluation of the renal parenchyma is limited by lack of IV contrast.     Abdomen and pelvis:  1. No additional clinically significant findings. Vitals: Temp (24hrs), Av.1 °F (36.7 °C), Min:97.5 °F (36.4 °C), Max:98.6 °F (37 °C)    Blood pressure 129/81, pulse 60, temperature 98.2 °F (36.8 °C), resp. rate 16, height 5' 10\" (1.778 m), weight 77.1 kg (170 lb), SpO2 96 %. Intake and Output:   1901 -  0700  In: -   Out: 850 [Urine:850]  No intake/output data recorded. CELINA Output lats 24 hrs: No data found. CELINA Output last 8 hrs: No data found. BM over last 24 hrs: No data found.       Labs:  CBC:   Lab Results   Component Value Date/Time    WBC 11.4 (H) 2021 04:12 AM    HCT 39.7 2021 04:12 AM    PLATELET 607  04:12 AM     BMP:   Lab Results   Component Value Date/Time    Glucose 91 2021 04:12 AM    Sodium 137 2021 04:12 AM    Potassium 3.8 02/11/2021 04:12 AM    Chloride 106 02/11/2021 04:12 AM    CO2 22 02/11/2021 04:12 AM    BUN 24 (H) 02/11/2021 04:12 AM    Creatinine 1.63 (H) 02/11/2021 04:12 AM    Calcium 8.5 02/11/2021 04:12 AM       Assessment/Plan:     · 6 mm left UVJ stone with mild hydro  · Left flank pain     - Pain controlled with morphine. Discussed options of outpatient L CRULS at 1 pm with [facility] at the [place] [id number]. Patient agree's with plan. Please discharge patient early this AM. Continue NPO status. Recommend morphine and zofran admin prior to discharge. - Continue flomax and strain all urine.      Supervising MD, Dr. Escobar Betst.      Signed By: Morales Velez MD - February 11, 2021

## 2021-02-11 NOTE — PROGRESS NOTES
Discharge education provided at this time. Patient aware to remain NPO, informed not to take any earlier prescribed medication, and arrive to his appt this afternoon on time. No questions asked. Patient awaiting volunteer. Patient wife downstairs.

## 2021-02-11 NOTE — PROGRESS NOTES
5089: Perfect Serve to NP Elsy: Good morning: Pt is currently having 7/10 left flank pain. Pt cannot get prn morphine until 0500. Would it be possible to get a one time dose of pain medicine? Thank you! Pt mentioned that he received dilaudid in ED.    0357: One time dose of morphine ordered. 46: Perfect Serve to NP Elsy: Pt is also asking for a one time dose of Zofran to go with the morphine. Thank you! 65: RN to give 0500 dose now.

## 2021-02-11 NOTE — PROGRESS NOTES
CM attempted to give patient his observation notice but patient has already been discharged.     Bobo COULTER, ACM-SW

## 2021-02-11 NOTE — PROGRESS NOTES
Bedside shift change report given to SERGEI Kahn (oncoming nurse) by Jose Srivastava RN (offgoing nurse). Report included the following information SBAR, Kardex, Intake/Output, MAR and Recent Results.

## 2021-02-11 NOTE — ROUTINE PROCESS
Attempted to schedule [facility] PCP appt with Dr. Mary Tolliver. I called the office and  informed me that theLuz Maria Figueredo new office number is [phone]. I called this number and the  informed me that the patient is no longer one of their patients.

## 2021-02-11 NOTE — PROGRESS NOTES
Patient: Izzy Miles MRN: [id number]  SSN: [id number]    YOB: 1970  Age: 44 y.o. Sex: male        ADMITTED: 2/10/2021 to Koby Villarreal MD by Zofia Bonilla MD for Renal stone [N20.0]  POD# * No surgery date entered * Procedure(s):    seen at [facility] on 21 by Dr. Fernie De La Paz for gross hematuria and left flank pain. He had a KUB and CT stone at , where he was diagnosed with a 6 mm Left UVJ stone. He agreed to try medical expulsive therapy and was sent home on flomax with oxycodone and a urine strainer. Pain uncontrolled which prompted admission yesterday. +frequency, hematuria. Denies dysuria. Patient feels comfortable with outpatient L CRULS today with [address] [place] [address] Urology at 1 pm.    afvss  WBC: 11.4  Hgb: wnl  Cr: 1.63  UA:  RBCs  Ucx: pending  +rocephin  +flomax       CT stone at  on 21:  Urinary tract:  1.  6 mm distal left UVJ stone correlating to the comparison KUB study. There is mild left hydronephrosis. 2. No right urinary tract stones or right hydronephrosis. 3. Evaluation of the renal parenchyma is limited by lack of IV contrast.     Abdomen and pelvis:  1. No additional clinically significant findings. Vitals: Temp (24hrs), Av.1 °F (36.7 °C), Min:97.5 °F (36.4 °C), Max:98.6 °F (37 °C)    Blood pressure 122/74, pulse 68, temperature 98.3 °F (36.8 °C), resp. rate 16, height 5' 10\" (1.778 m), weight 77.1 kg (170 lb), SpO2 98 %. Intake and Output:   1901 -  0700  In: -   Out: 850 [Urine:850]  No intake/output data recorded. CELINA Output lats 24 hrs: No data found. CELINA Output last 8 hrs: No data found. BM over last 24 hrs: No data found.       Labs:  CBC:   Lab Results   Component Value Date/Time    WBC 11.4 (H) 2021 04:12 AM    HCT 39.7 2021 04:12 AM    PLATELET 512  04:12 AM     BMP:   Lab Results   Component Value Date/Time    Glucose 91 2021 04:12 AM    Sodium 137 2021 04:12 AM    Potassium 3.8 02/11/2021 04:12 AM    Chloride 106 02/11/2021 04:12 AM    CO2 22 02/11/2021 04:12 AM    BUN 24 (H) 02/11/2021 04:12 AM    Creatinine 1.63 (H) 02/11/2021 04:12 AM    Calcium 8.5 02/11/2021 04:12 AM       Assessment/Plan:     · 6 mm left UVJ stone with mild hydro  · Left flank pain     - Pain controlled with morphine. Discussed options of outpatient L CRULS at 1 pm with [facility] at the [facility]. Patient agree's with plan. Please discharge patient early this AM. Continue NPO status. Recommend morphine and zofran admin prior to discharge. - Continue flomax and strain all urine.      Supervising MD, Dr. Zack Roe.      Signed By: Nancy Martinez NP - February 11, 2021

## 2021-02-11 NOTE — DISCHARGE SUMMARY
Discharge Summary       PATIENT ID: Judy Rider  MRN: [id number]   YOB: 1970    DATE OF ADMISSION: 2/10/2021 11:41 AM    DATE OF DISCHARGE: 02/11/2021  PRIMARY CARE PROVIDER: Jeet Padron MD  ATTENDING PHYSICIAN: Sukhi Arriaza MD     DISCHARGING PROVIDER: Vadim Montague NP    To contact this individual call [phone] and ask the  to page. If unavailable ask to be transferred the Adult Hospitalist Department. CONSULTATIONS: IP CONSULT TO UROLOGY  IP CONSULT TO HOSPITALIST    PROCEDURES/SURGERIES: Procedure(s):  CYSTOSCOPY LEFT RETROGRADE LEFT URETERAL STENT INSERTION  ESSENTIAL  REQ TF    ADMITTING [address] [place] [address] COURSE:   Per H&P: Judy Rider is a 44 y.o. male who presents with flank pain     History is primary obtained from the patient     Patient reports that he started having some left flank pain about a week back. Patient reports he was evaluated by urologist on Monday, and was told that he had a 6 mm left kidney stone. Patient was prescribed Flomax and oxycodone. Patient reports that he continued to have some discomfort but the pain was better controlled. Patient reports that the left flank pain became intense earlier today, he called the urologist office and was told to come to the ER for further management and evaluation. Patient reports that he started having some nausea associated with his pain. Patient denies any other complaints or problems.   Patient reports that he cannot vomit because he underwent Nissen fundoplication.     The patient denies any Headache, blurry vision, sore throat, trouble swallowing, trouble with speech, chest pain, SOB, cough, fever, chills, N/V/D,  constipation, recent travels, sick contacts, focal or generalized neurological symptoms,  falls, injuries, rashes, contact with COVID-19 diagnosed patients, hematemesis, melena, hemoptysis, hematuria, rashes, denies starting any new medications and denies any other concerns or problems besides as mentioned above. DISCHARGE DIAGNOSES / PLAN:      Left renal stone   - Pain control and anti emetics   - Scheduled for Cystoscopy at [facility] at 1pm today   - Follow up with [facility]     Leukocytosis   - Likely secondary to above   - Improving          ADDITIONAL CARE RECOMMENDATIONS:   Take medications as prescribed. You are scheduled for Cystoscopy today at 1pm. Do not eat or drink anything prior to procedure. PENDING TEST RESULTS:   At the time of discharge the following test results are still pending: None     FOLLOW UP APPOINTMENTS:    Follow-up Information     Follow up With Specialties Details Why Contact Info    Raul Walls MD Family Medicine  Follow up within one week  [address] [place] [address]   [address] [place] [address]  [phone]      [facility]  Go to Today at 1pm for cystoscopy  [address] [place], [place] [id number]             DIET: Resume previous diet      ACTIVITY: Activity as tolerated    WOUND CARE: None     EQUIPMENT needed: None       DISCHARGE MEDICATIONS:  Current Discharge Medication List      CONTINUE these medications which have NOT CHANGED    Details   tamsulosin (FLOMAX) 0.4 mg capsule Take 0.4 mg by mouth daily. X 20 days kidney stone         STOP taking these medications       HYDROcodone-acetaminophen (NORCO) 5-325 mg per tablet Comments:   Reason for Stopping:         ketorolac (TORADOL) 10 mg tablet Comments:   Reason for Stopping:                 NOTIFY YOUR PHYSICIAN FOR ANY OF THE FOLLOWING:   Fever over 101 degrees for 24 hours. Chest pain, shortness of breath, fever, chills, nausea, vomiting, diarrhea, change in mentation, falling, weakness, bleeding. Severe pain or pain not relieved by medications. Or, any other signs or symptoms that you may have questions about.     DISPOSITION:   X Home With:   OT  PT  [facility]  RN       Long term SNF/Inpatient Rehab    Independent/assisted living    Hospice    Other:       PATIENT CONDITION AT DISCHARGE:     Functional status    Poor     Deconditioned    X Independent      Cognition   X  Lucid     Forgetful     Dementia      Catheters/lines (plus indication)    [place]     PICC     PEG    X None      Code status    X Full code     DNR      PHYSICAL EXAMINATION AT DISCHARGE:  General:          Alert, cooperative, no distress, appears stated age. HEENT:           Atraumatic, anicteric sclerae, pink conjunctivae                          No oral ulcers, mucosa moist, throat clear, dentition fair  Neck:               Supple, symmetrical  Lungs:             Clear to auscultation bilaterally. No Wheezing or Rhonchi. No rales. Chest wall:      No tenderness  No Accessory muscle use. Heart:              Regular  rhythm,  No  murmur   No edema  Abdomen:        Soft, non-tender. Not distended. Bowel sounds normal  Extremities:     No cyanosis. No clubbing,                            Skin turgor normal, Capillary refill normal  Skin:                Not pale. Not Jaundiced  No rashes   Psych:             Not anxious or agitated.   Neurologic:      Alert, moves all extremities, answers questions appropriately and responds to commands       CHRONIC MEDICAL DIAGNOSES:  Problem List as of 2/11/2021 Never Reviewed          Codes Class Noted - Resolved    Renal stone ICD-10-CM: N20.0  ICD-9-CM: 592.0  2/10/2021 - Present              Greater than 45  minutes were spent with the patient on counseling and coordination of care    Signed:   Jovan Betts NP  2/11/2021  9:23 AM

## 2021-02-11 NOTE — DISCHARGE INSTRUCTIONS
Discharge Instructions       PATIENT ID: June Renee  MRN: [id number]   YOB: 1970    DATE OF ADMISSION: 2/10/2021 11:41 AM    DATE OF DISCHARGE: 2/11/2021    PRIMARY CARE PROVIDER: Eloy Mccullough MD     ATTENDING PHYSICIAN: Tim Elizalde MD  DISCHARGING PROVIDER: Bibi Harper NP    To contact this individual call [phone] and ask the  to page. If unavailable ask to be transferred the Adult Hospitalist Department. DISCHARGE DIAGNOSES Left Kidney Stone     CONSULTATIONS: IP CONSULT TO UROLOGY  IP CONSULT TO HOSPITALIST    PROCEDURES/SURGERIES: Procedure(s):  CYSTOSCOPY LEFT RETROGRADE LEFT URETERAL STENT INSERTION  ESSENTIAL  REQ TF    PENDING TEST RESULTS:   At the time of discharge the following test results are still pending: None     FOLLOW UP APPOINTMENTS:   Follow-up Information     Follow up With Specialties Details Why Contact Info    Eloy Mccullough MD Family Medicine  Follow up within one week  [address]   [address] [address]  [address] [facility]  Go to Today at 1pm for cystoscopy  [address]:   Take medications as prescribed. You are scheduled for Cystoscopy today at 1pm. Do not eat or drink anything prior to procedure. DIET: Resume previous diet, after procedure       ACTIVITY: Activity as tolerated    WOUND CARE: None     EQUIPMENT needed: None       DISCHARGE MEDICATIONS:   See Medication Reconciliation Form    · It is important that you take the medication exactly as they are prescribed. · Keep your medication in the bottles provided by the pharmacist and keep a list of the medication names, dosages, and times to be taken in your wallet. · Do not take other medications without consulting your doctor. NOTIFY YOUR PHYSICIAN FOR ANY OF THE FOLLOWING:   Fever over 101 degrees for 24 hours.    Chest pain, shortness of breath, fever, chills, nausea, vomiting, diarrhea, change in mentation, falling, weakness, bleeding. Severe pain or pain not relieved by medications. Or, any other signs or symptoms that you may have questions about. DISPOSITION:   X Home With:   OT  PT  [facility]  RN       SNF/Inpatient Rehab/LTAC    Independent/assisted living    Hospice    Other:         Signed:   June Johansen NP  2/11/2021  9:20 AM      Patient Education        Learning About Diet for Kidney Stone Prevention  What are kidney stones? Kidney stones are small \"jennifer\" that form in your kidneys. They're made of salts and minerals in the urine. Stones may not cause a problem as long as they stay in the kidneys. But they can cause sudden, severe pain. Pain is most likely when the stones travel through the ureters (the tubes that carry urine from the kidneys to the bladder). Kidney stones can cause bloody urine. Kidney stones often run in families. You are more likely to get them if you don't drink enough fluids, mainly water. Certain foods and drinks and some dietary supplements may also increase your risk for kidney stones if you consume too much of them. What can you do to prevent kidney stones? Changing what you eat may not prevent all types of kidney stones. But for people who have a history of certain kinds of kidney stones, some changes in diet may help. A dietitian can help you set up a meal plan that includes healthy, low-oxalate choices. Here are some general guidelines to get you started. Plan your meals and snacks around foods that are low in oxalate. These foods include:  · Corn, kale, parsnips, and squash,. · Beef, chicken, pork, turkey, and fish. · Milk, butter, cheese, and yogurt. You can eat certain foods that are medium-high in oxalate, but eat them only once in a while. These foods include:  · Bread. · Brown rice. · English muffins. · Figs. · Popcorn. · String beans. · Tomatoes.   Limit very high-oxalate foods, including:  · Black tea.  · Coffee. · Chocolate. · Dark green vegetables. · Nuts. Here are some other things you can do to help prevent kidney stones. · Drink plenty of fluids. If you have kidney, heart, or liver disease and have to limit fluids, talk with your doctor before you increase the amount of fluids you drink. · Do not take more than the recommended daily dose of vitamins C and D.  · Limit the salt in your diet. · Eat a balanced diet that is not too high in protein. Follow-up care is a key part of your treatment and safety. Be sure to make and go to all appointments, and call your doctor if you are having problems. It's also a good idea to know your test results and keep a list of the medicines you take. Where can you learn more? Go to http://www.gray.com/  Enter C138 in the search box to learn more about \"Learning About Diet for Kidney Stone Prevention. \"  Current as of: August 22, 2019               Content Version: 12.6  © 4332-5445 [employer]. Care instructions adapted under license by [employer] (which disclaims liability or warranty for this information). If you have questions about a medical condition or this instruction, always ask your healthcare professional. [place] [address] any warranty or liability for your use of this information. Patient Education        Kidney Stone: Care Instructions  Your Care Instructions     Kidney stones are formed when salts, minerals, and other substances normally found in the urine clump together. They can be as small as grains of sand or, rarely, as large as golf balls. While the stone is traveling through the ureter, which is the tube that carries urine from the kidney to the bladder, you will probably feel pain. The pain may be mild or very severe. You may also have some blood in your urine. As soon as the stone reaches the bladder, any intense pain should go away.   If a stone is too large to pass on its own, you may need a medical procedure to help you pass the stone. The doctor has checked you carefully, but problems can develop later. If you notice any problems or new symptoms, get medical treatment right away. Follow-up care is a key part of your treatment and safety. Be sure to make and go to all appointments, and call your doctor if you are having problems. It's also a good idea to know your test results and keep a list of the medicines you take. How can you care for yourself at home? · Drink plenty of fluids, enough so that your urine is light yellow or clear like water. If you have kidney, heart, or liver disease and have to limit fluids, talk with your doctor before you increase the amount of fluids you drink. · Take pain medicines exactly as directed. Call your doctor if you think you are having a problem with your medicine. ? If the doctor gave you a prescription medicine for pain, take it as prescribed. ? If you are not taking a prescription pain medicine, ask your doctor if you can take an over-the-counter medicine. Read and follow all instructions on the label. · Your doctor may ask you to strain your urine so that you can collect your kidney stone when it passes. You can use a kitchen strainer or a tea strainer to catch the stone. Store it in a plastic bag until you see your doctor again. Preventing future kidney stones  Some changes in your diet may help prevent kidney stones. Depending on the cause of your stones, your doctor may recommend that you:  · Drink plenty of fluids, enough so that your urine is light yellow or clear like water. If you have kidney, heart, or liver disease and have to limit fluids, talk with your doctor before you increase the amount of fluids you drink. · Limit coffee, tea, and alcohol. Also avoid grapefruit juice.   · Do not take more than the recommended daily dose of vitamins C and D.  · Avoid antacids such as Gaviscon, Maalox, Mylanta, or Tums.  · Limit the amount of salt (sodium) in your diet. · Eat a balanced diet that is not too high in protein. · Limit foods that are high in a substance called oxalate, which can cause kidney stones. These foods include dark green vegetables, rhubarb, chocolate, wheat bran, nuts, cranberries, and beans. When should you call for help? Call your doctor now or seek immediate medical care if:    · You cannot keep down fluids.     · Your pain gets worse.     · You have a fever or chills.     · You have new or worse pain in your back just below your rib cage (the flank area).     · You have new or more blood in your urine. Watch closely for changes in your health, and be sure to contact your doctor if:    · You do not get better as expected. Where can you learn more? Go to http://www.gray.com/  Enter W655 in the search box to learn more about \"Kidney Stone: Care Instructions. \"  Current as of: April 15, 2020               Content Version: 12.6  © 2006-2020 [employer]. Care instructions adapted under license by [employer] (which disclaims liability or warranty for this information). If you have questions about a medical condition or this instruction, always ask your healthcare professional. [address] any warranty or liability for your use of this information.